# Patient Record
Sex: FEMALE | Race: ASIAN | NOT HISPANIC OR LATINO | URBAN - METROPOLITAN AREA
[De-identification: names, ages, dates, MRNs, and addresses within clinical notes are randomized per-mention and may not be internally consistent; named-entity substitution may affect disease eponyms.]

---

## 2021-02-14 ENCOUNTER — EMERGENCY (EMERGENCY)
Facility: HOSPITAL | Age: 31
LOS: 1 days | Discharge: HOME | End: 2021-02-14
Admitting: STUDENT IN AN ORGANIZED HEALTH CARE EDUCATION/TRAINING PROGRAM
Payer: MEDICAID

## 2021-02-14 VITALS
SYSTOLIC BLOOD PRESSURE: 114 MMHG | DIASTOLIC BLOOD PRESSURE: 74 MMHG | TEMPERATURE: 98 F | HEART RATE: 87 BPM | OXYGEN SATURATION: 100 % | RESPIRATION RATE: 18 BRPM

## 2021-02-14 VITALS
SYSTOLIC BLOOD PRESSURE: 107 MMHG | OXYGEN SATURATION: 100 % | DIASTOLIC BLOOD PRESSURE: 63 MMHG | RESPIRATION RATE: 16 BRPM | HEART RATE: 75 BPM | TEMPERATURE: 99 F

## 2021-02-14 DIAGNOSIS — Z90.49 ACQUIRED ABSENCE OF OTHER SPECIFIED PARTS OF DIGESTIVE TRACT: Chronic | ICD-10-CM

## 2021-02-14 LAB
ALBUMIN SERPL ELPH-MCNC: 4.1 G/DL — SIGNIFICANT CHANGE UP (ref 3.3–5)
ALP SERPL-CCNC: 99 U/L — SIGNIFICANT CHANGE UP (ref 40–120)
ALT FLD-CCNC: 27 U/L — SIGNIFICANT CHANGE UP (ref 4–33)
ANION GAP SERPL CALC-SCNC: 9 MMOL/L — SIGNIFICANT CHANGE UP (ref 7–14)
APPEARANCE UR: CLEAR — SIGNIFICANT CHANGE UP
AST SERPL-CCNC: 18 U/L — SIGNIFICANT CHANGE UP (ref 4–32)
BASOPHILS # BLD AUTO: 0.04 K/UL — SIGNIFICANT CHANGE UP (ref 0–0.2)
BASOPHILS NFR BLD AUTO: 0.3 % — SIGNIFICANT CHANGE UP (ref 0–2)
BILIRUB SERPL-MCNC: <0.2 MG/DL — SIGNIFICANT CHANGE UP (ref 0.2–1.2)
BILIRUB UR-MCNC: NEGATIVE — SIGNIFICANT CHANGE UP
BUN SERPL-MCNC: 8 MG/DL — SIGNIFICANT CHANGE UP (ref 7–23)
CALCIUM SERPL-MCNC: 10 MG/DL — SIGNIFICANT CHANGE UP (ref 8.4–10.5)
CHLORIDE SERPL-SCNC: 103 MMOL/L — SIGNIFICANT CHANGE UP (ref 98–107)
CO2 SERPL-SCNC: 27 MMOL/L — SIGNIFICANT CHANGE UP (ref 22–31)
COLOR SPEC: SIGNIFICANT CHANGE UP
CREAT SERPL-MCNC: 0.53 MG/DL — SIGNIFICANT CHANGE UP (ref 0.5–1.3)
DIFF PNL FLD: NEGATIVE — SIGNIFICANT CHANGE UP
EOSINOPHIL # BLD AUTO: 0.17 K/UL — SIGNIFICANT CHANGE UP (ref 0–0.5)
EOSINOPHIL NFR BLD AUTO: 1.4 % — SIGNIFICANT CHANGE UP (ref 0–6)
GLUCOSE SERPL-MCNC: 105 MG/DL — HIGH (ref 70–99)
GLUCOSE UR QL: NEGATIVE — SIGNIFICANT CHANGE UP
HCT VFR BLD CALC: 36.3 % — SIGNIFICANT CHANGE UP (ref 34.5–45)
HGB BLD-MCNC: 11.8 G/DL — SIGNIFICANT CHANGE UP (ref 11.5–15.5)
IANC: 8.12 K/UL — SIGNIFICANT CHANGE UP (ref 1.5–8.5)
IMM GRANULOCYTES NFR BLD AUTO: 0.3 % — SIGNIFICANT CHANGE UP (ref 0–1.5)
KETONES UR-MCNC: NEGATIVE — SIGNIFICANT CHANGE UP
LEUKOCYTE ESTERASE UR-ACNC: NEGATIVE — SIGNIFICANT CHANGE UP
LIDOCAIN IGE QN: 37 U/L — SIGNIFICANT CHANGE UP (ref 7–60)
LYMPHOCYTES # BLD AUTO: 2.88 K/UL — SIGNIFICANT CHANGE UP (ref 1–3.3)
LYMPHOCYTES # BLD AUTO: 23.6 % — SIGNIFICANT CHANGE UP (ref 13–44)
MCHC RBC-ENTMCNC: 27.8 PG — SIGNIFICANT CHANGE UP (ref 27–34)
MCHC RBC-ENTMCNC: 32.5 GM/DL — SIGNIFICANT CHANGE UP (ref 32–36)
MCV RBC AUTO: 85.6 FL — SIGNIFICANT CHANGE UP (ref 80–100)
MONOCYTES # BLD AUTO: 0.94 K/UL — HIGH (ref 0–0.9)
MONOCYTES NFR BLD AUTO: 7.7 % — SIGNIFICANT CHANGE UP (ref 2–14)
NEUTROPHILS # BLD AUTO: 8.12 K/UL — HIGH (ref 1.8–7.4)
NEUTROPHILS NFR BLD AUTO: 66.7 % — SIGNIFICANT CHANGE UP (ref 43–77)
NITRITE UR-MCNC: NEGATIVE — SIGNIFICANT CHANGE UP
NRBC # BLD: 0 /100 WBCS — SIGNIFICANT CHANGE UP
NRBC # FLD: 0 K/UL — SIGNIFICANT CHANGE UP
PH UR: 6 — SIGNIFICANT CHANGE UP (ref 5–8)
PLATELET # BLD AUTO: 349 K/UL — SIGNIFICANT CHANGE UP (ref 150–400)
POTASSIUM SERPL-MCNC: 4 MMOL/L — SIGNIFICANT CHANGE UP (ref 3.5–5.3)
POTASSIUM SERPL-SCNC: 4 MMOL/L — SIGNIFICANT CHANGE UP (ref 3.5–5.3)
PROT SERPL-MCNC: 7.7 G/DL — SIGNIFICANT CHANGE UP (ref 6–8.3)
PROT UR-MCNC: NEGATIVE — SIGNIFICANT CHANGE UP
RBC # BLD: 4.24 M/UL — SIGNIFICANT CHANGE UP (ref 3.8–5.2)
RBC # FLD: 13.3 % — SIGNIFICANT CHANGE UP (ref 10.3–14.5)
SODIUM SERPL-SCNC: 139 MMOL/L — SIGNIFICANT CHANGE UP (ref 135–145)
SP GR SPEC: 1.01 — SIGNIFICANT CHANGE UP (ref 1.01–1.02)
UROBILINOGEN FLD QL: SIGNIFICANT CHANGE UP
WBC # BLD: 12.19 K/UL — HIGH (ref 3.8–10.5)
WBC # FLD AUTO: 12.19 K/UL — HIGH (ref 3.8–10.5)

## 2021-02-14 PROCEDURE — 99284 EMERGENCY DEPT VISIT MOD MDM: CPT

## 2021-02-14 PROCEDURE — 76705 ECHO EXAM OF ABDOMEN: CPT | Mod: 26

## 2021-02-14 RX ORDER — FAMOTIDINE 10 MG/ML
20 INJECTION INTRAVENOUS ONCE
Refills: 0 | Status: COMPLETED | OUTPATIENT
Start: 2021-02-14 | End: 2021-02-14

## 2021-02-14 RX ORDER — SODIUM CHLORIDE 9 MG/ML
1000 INJECTION INTRAMUSCULAR; INTRAVENOUS; SUBCUTANEOUS ONCE
Refills: 0 | Status: COMPLETED | OUTPATIENT
Start: 2021-02-14 | End: 2021-02-14

## 2021-02-14 RX ADMIN — FAMOTIDINE 20 MILLIGRAM(S): 10 INJECTION INTRAVENOUS at 22:42

## 2021-02-14 RX ADMIN — SODIUM CHLORIDE 1000 MILLILITER(S): 9 INJECTION INTRAMUSCULAR; INTRAVENOUS; SUBCUTANEOUS at 22:42

## 2021-02-14 NOTE — ED PROVIDER NOTE - NSFOLLOWUPCLINICS_GEN_ALL_ED_FT
[facility]  Gastroenterology  [address] [place] [id number]  Phone: [phone]  Fax:   Follow Up Time: Routine

## 2021-02-14 NOTE — ED ADULT NURSE NOTE - NSIMPLEMENTINTERV_GEN_ALL_ED
Implemented All Universal Safety Interventions:  [place] to call system. Call bell, personal items and telephone within reach. Instruct patient to call for assistance. Room bathroom lighting operational. Non-slip footwear when patient is off stretcher. Physically safe environment: no spills, clutter or unnecessary equipment. Stretcher in lowest position, wheels locked, appropriate side rails in place.

## 2021-02-14 NOTE — ED PROVIDER NOTE - PATIENT PORTAL LINK FT
You can access the FollowMyHealth Patient Portal offered by [facility] by registering at the following website: [email or web address]. By joining [facility]’s FollowMyHealth portal, you will also be able to view your health information using other applications (apps) compatible with our system.

## 2021-02-14 NOTE — ED PROVIDER NOTE - OBJECTIVE STATEMENT
30yF w/pmhx hypothyroid, pshx appendectomy presenting with upper abdominal pain x today. Pt states she woke this morning at 5am with upper abdominal pain, pain has been intermittent, worse after eating. Pt reports pain to bilateral upper quadrants. Pt denies fever/chills, nausea, vomiting, diarrhea, pelvic pain, dysuria, urinary frequency, vaginal discharge, cp, sob, headache, dizziness, recent travel or illness or any other concerns.

## 2021-02-14 NOTE — ED PROVIDER NOTE - CLINICAL SUMMARY MEDICAL DECISION MAKING FREE TEXT BOX
30yF w/pmhx hypothyroid, pshx appendectomy presenting with upper abdominal pain x today, intermittent. On exam pt is well appearing, +epigastric and RUQ tenderness. Concern for gallstones (biliary colic), pancreatitis (less likely), gastritis. Plan: cbc/cmp/lipase, US RUQ, trial pepcid, ucg, ua/culture

## 2021-02-14 NOTE — ED PROVIDER NOTE - PROGRESS NOTE DETAILS
[place]: Pt feeling better. Abd pain has resolved. Abd soft non tender. Pt tolerating PO. Labs unremarkable. Imaging negative. Advised to follow up with PCP within the next 1-2 days. If any worsening symptoms return to Emergency Department immediately. Pt verbalizes agreement and understanding. VSS.

## 2021-02-14 NOTE — ED ADULT NURSE NOTE - OBJECTIVE STATEMENT
Received PT to room 7, AAO4, ambulatory, c/o generalized abdominal pain since this AM. Denies chest pain, SOB, n/v/d, dizziness, dysuria. Able to make needs known. Right AC 20g placed, labs drawn, medicated, will continue to monitor.

## 2021-02-15 LAB
CULTURE RESULTS: SIGNIFICANT CHANGE UP
SPECIMEN SOURCE: SIGNIFICANT CHANGE UP

## 2021-02-15 RX ORDER — PANTOPRAZOLE SODIUM 20 MG/1
1 TABLET, DELAYED RELEASE ORAL
Qty: 10 | Refills: 0
Start: 2021-02-15 | End: 2021-02-24

## 2021-02-15 RX ADMIN — Medication 30 MILLILITER(S): at 01:16

## 2021-07-13 PROBLEM — E03.9 HYPOTHYROIDISM, UNSPECIFIED: Chronic | Status: ACTIVE | Noted: 2021-02-14

## 2021-07-22 PROBLEM — Z00.00 ENCOUNTER FOR PREVENTIVE HEALTH EXAMINATION: Status: ACTIVE | Noted: 2021-07-22

## 2021-07-28 ENCOUNTER — LABORATORY RESULT (OUTPATIENT)
Age: 31
End: 2021-07-28

## 2021-07-28 ENCOUNTER — APPOINTMENT (OUTPATIENT)
Dept: INTERNAL MEDICINE | Facility: CLINIC | Age: 31
End: 2021-07-28
Payer: MEDICAID

## 2021-07-28 VITALS
OXYGEN SATURATION: 96 % | TEMPERATURE: 97.7 F | WEIGHT: 220.99 LBS | HEART RATE: 122 BPM | HEIGHT: 64.53 IN | BODY MASS INDEX: 37.27 KG/M2 | SYSTOLIC BLOOD PRESSURE: 115 MMHG | DIASTOLIC BLOOD PRESSURE: 81 MMHG

## 2021-07-28 DIAGNOSIS — E53.8 DEFICIENCY OF OTHER SPECIFIED B GROUP VITAMINS: ICD-10-CM

## 2021-07-28 DIAGNOSIS — R20.0 ANESTHESIA OF SKIN: ICD-10-CM

## 2021-07-28 DIAGNOSIS — M25.50 PAIN IN UNSPECIFIED JOINT: ICD-10-CM

## 2021-07-28 DIAGNOSIS — R79.89 OTHER SPECIFIED ABNORMAL FINDINGS OF BLOOD CHEMISTRY: ICD-10-CM

## 2021-07-28 PROCEDURE — 99203 OFFICE O/P NEW LOW 30 MIN: CPT | Mod: 25

## 2021-08-03 LAB
25(OH)D3 SERPL-MCNC: 26.2 NG/ML
ALBUMIN SERPL ELPH-MCNC: 4.7 G/DL
ALP BLD-CCNC: 88 U/L
ALT SERPL-CCNC: 28 U/L
ANION GAP SERPL CALC-SCNC: 15 MMOL/L
APPEARANCE: ABNORMAL
APPEARANCE: ABNORMAL
AST SERPL-CCNC: 19 U/L
BACTERIA: NEGATIVE
BASOPHILS # BLD AUTO: 0.07 K/UL
BASOPHILS NFR BLD AUTO: 0.4 %
BILIRUB SERPL-MCNC: 0.2 MG/DL
BILIRUBIN URINE: NEGATIVE
BILIRUBIN URINE: NEGATIVE
BLOOD URINE: NEGATIVE
BLOOD URINE: NEGATIVE
BUN SERPL-MCNC: 11 MG/DL
CALCIUM SERPL-MCNC: 9.9 MG/DL
CELIACPAN: NORMAL
CHLORIDE SERPL-SCNC: 99 MMOL/L
CHOLEST SERPL-MCNC: 218 MG/DL
CO2 SERPL-SCNC: 25 MMOL/L
COLOR: YELLOW
COLOR: YELLOW
CREAT SERPL-MCNC: 0.54 MG/DL
EOSINOPHIL # BLD AUTO: 0.11 K/UL
EOSINOPHIL NFR BLD AUTO: 0.6 %
ESTIMATED AVERAGE GLUCOSE: 111 MG/DL
GLUCOSE QUALITATIVE U: NEGATIVE
GLUCOSE QUALITATIVE U: NEGATIVE
GLUCOSE SERPL-MCNC: 83 MG/DL
HBA1C MFR BLD HPLC: 5.5 %
HCT VFR BLD CALC: 41.5 %
HDLC SERPL-MCNC: 60 MG/DL
HGB BLD-MCNC: 12.7 G/DL
HYALINE CASTS: 3 /LPF
IF BLOCK AB SER QL: 1 AU/ML
IMM GRANULOCYTES NFR BLD AUTO: 0.4 %
KETONES URINE: NEGATIVE
KETONES URINE: NEGATIVE
LDLC SERPL CALC-MCNC: 115 MG/DL
LEUKOCYTE ESTERASE URINE: NEGATIVE
LEUKOCYTE ESTERASE URINE: NEGATIVE
LYMPHOCYTES # BLD AUTO: 3.95 K/UL
LYMPHOCYTES NFR BLD AUTO: 20.4 %
MAN DIFF?: NORMAL
MCHC RBC-ENTMCNC: 28.5 PG
MCHC RBC-ENTMCNC: 30.6 GM/DL
MCV RBC AUTO: 93 FL
MICROSCOPIC-UA: NORMAL
MONOCYTES # BLD AUTO: 1.49 K/UL
MONOCYTES NFR BLD AUTO: 7.7 %
NEUTROPHILS # BLD AUTO: 13.64 K/UL
NEUTROPHILS NFR BLD AUTO: 70.5 %
NITRITE URINE: NEGATIVE
NITRITE URINE: NEGATIVE
NONHDLC SERPL-MCNC: 158 MG/DL
PCA AB SER QL IF: NORMAL
PH URINE: 6
PH URINE: 6
PLATELET # BLD AUTO: 400 K/UL
POTASSIUM SERPL-SCNC: 3.8 MMOL/L
PROT SERPL-MCNC: 7.6 G/DL
PROTEIN URINE: NORMAL
PROTEIN URINE: NORMAL
RBC # BLD: 4.46 M/UL
RBC # FLD: 13.3 %
RED BLOOD CELLS URINE: 1 /HPF
SODIUM SERPL-SCNC: 138 MMOL/L
SPECIFIC GRAVITY URINE: 1.03
SPECIFIC GRAVITY URINE: 1.03
SQUAMOUS EPITHELIAL CELLS: 1 /HPF
T3FREE SERPL-MCNC: 3.01 PG/ML
T4 FREE SERPL-MCNC: 1.3 NG/DL
TRIGL SERPL-MCNC: 211 MG/DL
TSH SERPL-ACNC: 6.04 UIU/ML
UROBILINOGEN URINE: NORMAL
UROBILINOGEN URINE: NORMAL
WBC # FLD AUTO: 19.33 K/UL
WHITE BLOOD CELLS URINE: 0 /HPF

## 2021-08-03 NOTE — PHYSICAL EXAM
[Normal] : no rash [de-identified] : Tenderness over the greater trochanter, LUAN test elicits pain over the greater trochanter right lower back [de-identified] : Tinel sign positive in the left hand

## 2021-08-03 NOTE — ASSESSMENT
[FreeTextEntry1] : To continue follow-up with rheumatology for joint pain\par Patient does not have any tender points, suspect that carpal tunnel may be contributing to symptoms advised wrist splint if no improvement in 4 to 6 weeks to see neurologist\par Back pain may be secondary to trochanteric bursitis advised to see orthopedic surgery\par Low vitamin C advised supplementation daily\par Low vitamin B12 continue with supplementation and to check intrinsic factor and antiparietal cell antibody factors

## 2021-08-03 NOTE — HISTORY OF PRESENT ILLNESS
[FreeTextEntry8] : Patient presents to the office for pain throughout body\par Has seen rheumatologist had blood work done was treated with medication for rheumatoid arthritis has not seen improvement.\par Was found to have elevated inflammatory markers subsequently improved,\par Does have pain in the left hand, pain in the left arm is with numbness in the fingertips worse at night or with activity.\par Has right lower back pain, the back pain is when lying on the affected side, denies any numbness has tried physical therapy anti-inflammatories and has had MRI with no pathology.\par Blood work showed low vitamin B12 and low vitamin C levels

## 2021-08-30 ENCOUNTER — APPOINTMENT (OUTPATIENT)
Dept: INTERNAL MEDICINE | Facility: CLINIC | Age: 31
End: 2021-08-30

## 2022-12-20 ENCOUNTER — EMERGENCY (EMERGENCY)
Facility: HOSPITAL | Age: 32
LOS: 1 days | Discharge: HOME | End: 2022-12-20
Attending: STUDENT IN AN ORGANIZED HEALTH CARE EDUCATION/TRAINING PROGRAM | Admitting: STUDENT IN AN ORGANIZED HEALTH CARE EDUCATION/TRAINING PROGRAM

## 2022-12-20 VITALS
OXYGEN SATURATION: 100 % | TEMPERATURE: 98 F | SYSTOLIC BLOOD PRESSURE: 109 MMHG | RESPIRATION RATE: 18 BRPM | DIASTOLIC BLOOD PRESSURE: 55 MMHG | HEART RATE: 79 BPM

## 2022-12-20 DIAGNOSIS — Z90.49 ACQUIRED ABSENCE OF OTHER SPECIFIED PARTS OF DIGESTIVE TRACT: Chronic | ICD-10-CM

## 2022-12-20 LAB
FLUAV AG NPH QL: SIGNIFICANT CHANGE UP
FLUBV AG NPH QL: SIGNIFICANT CHANGE UP
RSV RNA NPH QL NAA+NON-PROBE: SIGNIFICANT CHANGE UP
SARS-COV-2 RNA SPEC QL NAA+PROBE: SIGNIFICANT CHANGE UP

## 2022-12-20 PROCEDURE — 99220: CPT

## 2022-12-20 PROCEDURE — G1004: CPT

## 2022-12-20 PROCEDURE — 72148 MRI LUMBAR SPINE W/O DYE: CPT | Mod: 26,MF

## 2022-12-20 RX ORDER — ACETAMINOPHEN 500 MG
650 TABLET ORAL EVERY 6 HOURS
Refills: 0 | Status: DISCONTINUED | OUTPATIENT
Start: 2022-12-20 | End: 2022-12-23

## 2022-12-20 RX ORDER — KETOROLAC TROMETHAMINE 30 MG/ML
15 SYRINGE (ML) INJECTION EVERY 6 HOURS
Refills: 0 | Status: DISCONTINUED | OUTPATIENT
Start: 2022-12-20 | End: 2022-12-21

## 2022-12-20 RX ORDER — DIAZEPAM 5 MG
5 TABLET ORAL EVERY 8 HOURS
Refills: 0 | Status: DISCONTINUED | OUTPATIENT
Start: 2022-12-20 | End: 2022-12-21

## 2022-12-20 RX ORDER — OXYCODONE HYDROCHLORIDE 5 MG/1
5 TABLET ORAL ONCE
Refills: 0 | Status: DISCONTINUED | OUTPATIENT
Start: 2022-12-20 | End: 2022-12-20

## 2022-12-20 RX ORDER — ACETAMINOPHEN 500 MG
975 TABLET ORAL ONCE
Refills: 0 | Status: COMPLETED | OUTPATIENT
Start: 2022-12-20 | End: 2022-12-20

## 2022-12-20 RX ORDER — KETOROLAC TROMETHAMINE 30 MG/ML
15 SYRINGE (ML) INJECTION ONCE
Refills: 0 | Status: DISCONTINUED | OUTPATIENT
Start: 2022-12-20 | End: 2022-12-20

## 2022-12-20 RX ORDER — LIDOCAINE 4 G/100G
1 CREAM TOPICAL ONCE
Refills: 0 | Status: COMPLETED | OUTPATIENT
Start: 2022-12-20 | End: 2022-12-20

## 2022-12-20 RX ORDER — DIAZEPAM 5 MG
5 TABLET ORAL ONCE
Refills: 0 | Status: DISCONTINUED | OUTPATIENT
Start: 2022-12-20 | End: 2022-12-20

## 2022-12-20 RX ORDER — OXYCODONE HYDROCHLORIDE 5 MG/1
5 TABLET ORAL EVERY 8 HOURS
Refills: 0 | Status: DISCONTINUED | OUTPATIENT
Start: 2022-12-20 | End: 2022-12-20

## 2022-12-20 RX ADMIN — Medication 975 MILLIGRAM(S): at 07:54

## 2022-12-20 RX ADMIN — Medication 15 MILLIGRAM(S): at 23:18

## 2022-12-20 RX ADMIN — LIDOCAINE 1 PATCH: 4 CREAM TOPICAL at 07:14

## 2022-12-20 RX ADMIN — Medication 15 MILLIGRAM(S): at 15:35

## 2022-12-20 RX ADMIN — Medication 15 MILLIGRAM(S): at 07:13

## 2022-12-20 RX ADMIN — Medication 5 MILLIGRAM(S): at 07:12

## 2022-12-20 RX ADMIN — Medication 5 MILLIGRAM(S): at 15:34

## 2022-12-20 RX ADMIN — OXYCODONE HYDROCHLORIDE 5 MILLIGRAM(S): 5 TABLET ORAL at 10:09

## 2022-12-20 RX ADMIN — Medication 975 MILLIGRAM(S): at 08:34

## 2022-12-20 RX ADMIN — Medication 15 MILLIGRAM(S): at 23:33

## 2022-12-20 NOTE — ED CDU PROVIDER INITIAL DAY NOTE - EXTREMITY EXAM
limited ROM of lower extremities due to back pain, sensation intact b/l, strength 5/5 b/l, no foot drop b/l.

## 2022-12-20 NOTE — ED PROVIDER NOTE - NSFOLLOWUPINSTRUCTIONS_ED_ALL_ED_FT
Follow up with the [facility]. You will be called in 24-48 hours to make this appointment. Call the Emergency Department if you have difficulties getting your appointment. The phone number for the Emergency Department is found on the upper left hand side of this paperwork.     Immediately return to the Emergency Department for any new or markedly worsening symptoms.     the prescription sent to your pharmacy within the next 12 hours. Take all new and previous medications as prescribed.     Alternate between Tylenol and Ibuprofen. Take 1 g of Tylenol, 4 hours later take 600 mg of Ibuprofen, 4 hours after this take 1 g of Tylenol. Continuing alternating like this as needed for pain. If you do not have pain, you do not need to take this medication.      Use over counter ibuprofen and tylenol for management of your pain. Use only as needed. Read the instructions on the medication container carefully. Follow these instructions when using these medications. These medications can be dangerous when used incorrectly. Possible consequences include liver and kidney disease. These medications are very safe when used correctly.

## 2022-12-20 NOTE — ED PROVIDER NOTE - ATTENDING CONTRIBUTION TO CARE
31yo F ho hypothyroidism, rheumatoid arthritis, pw acute on chronic back pain. pt has been suffereing from lower back pain bl for several months, has been seen in EDs and pcp for back pain 33yo F ho hypothyroidism, rheumatoid arthritis, pw acute on chronic back pain. pt has been suffereing from lower back pain bl for several months, has been seen in EDs and pcp for back pain and given mobic  and methocarbamol, over last few days pain has been more severe, no lower extremity weakness, no numbness, has been able to ambulate, but with pain, no fecal or urinary incontinence   on exam no midline tendenress, + straight leg raise, motor and sensory intact, will give pain contorl, reassess

## 2022-12-20 NOTE — ED ADULT TRIAGE NOTE - CHIEF COMPLAINT QUOTE
Pt c/o lower back pain x few months. Pain worsened today. No heavy lifting or trauma to area. Denies numbness/tingling, urinary complaints. Denies PMHx

## 2022-12-20 NOTE — ED CDU PROVIDER INITIAL DAY NOTE - ATTENDING APP SHARED VISIT CONTRIBUTION OF CARE
31 yo F with PMH of RA, hypothyroid presents to ED c/o severe lower back pain since yesterday. In ED, pt given Tylenol 975mg , Toradol 15mg IV, Valium 5mg, Lidocaine patch, Oxycodone 5mg w/ mild relief. Plan: MR LS spine to r/o acute spinal pathology, pain control, and reassessment.

## 2022-12-20 NOTE — ED CDU PROVIDER INITIAL DAY NOTE - OBJECTIVE STATEMENT
Conjuntivae and eyelids appear normal 33 yo F with PMH of RA, hypothyroid presents to ED c/o severe lower back pain since yesterday. Reports sharp pain, in both side, 7/10, non-radiating, worse with sitting up, walking. States pain not improved with Tylenol and Ibuprofen. Admits she has same pain a month ago, was seen in the ED, given muscle relaxer. Denies hx of malignancy, unexplained weight loss, fever, rigors, malaise, recent infection, hx of IVDU, saddle anesthesia/perianal sensory loss, decreased rectal tone, urinary retention, inability to control urine from overflow, weakness, numbness, recent travel, or any other complaints.

## 2022-12-20 NOTE — ED PROVIDER NOTE - PHYSICAL EXAMINATION
Gen: NAD, non-toxic appearing  Head: normal appearing  HEENT: normal conjunctiva  Abd: soft, non distended, non tender   MSK: no visible deformities, paraspinal tenderness just superior to the sacrum, no midline tenderness  Neuro: alert and grossly oriented, no gross motor deficits, 5/5 strength lower extremities, intact sensation to light touch throughout the lower extremities  Skin: No natasha rashes

## 2022-12-20 NOTE — ED PROVIDER NOTE - PROGRESS NOTE DETAILS
Pretty Lund, PGY1, MD: Patient signed out to me pending pain medication and reassessment for symptom relief.  Patient seen and examined at bedside patient was standing with  for support, patient cannot bear weight on right leg due to tightness and pain in her right lower back.  Patient states that when she lays still she has no pain but her pain is exacerbated by standing and moving.  Patient appears uncomfortable, has tenderness to palpation in the right lower back, I personally ambulated the patient patient appears uncomfortable walking and cannot bear weight completely on her right due to pain exacerbation.  Patient has positive straight leg raise bilaterally.  Patient states that yesterday she had pain relief while walking, however today walking exacerbates pain.  there is no evidence of red flag symptoms at this time. we will attempt to treat pain with oxycodone 5 mg and if this does not improve the pain will consider CDU for admission for observation and pain control Pretty Lund, PGY1, MD:  Patient reassessed after being given 5 mg oxycodone, patient reports her pain is more improved. on exam: Patient able to bear more weight on her right however still is limping and appears in pain with ambulation.  Patient given several hot packs to try to relieve tightness in the lower back will reassess.  If patient pain is not improved and if she is unable to ambulate will need to CDU admission Pretty Lund, PGY1, MD: pt reassessed, reports improved pain however c/o dizziness from pain medications. Pt able to ambulate and bear weight on right better than before, still needs some assistance with ambulation. Pretty Lund, PGY1, MD: pt reassessed, reports improved pain however c/o dizziness from pain medications. Pt able to ambulate and bear weight on right better than before, still needs assistance with ambulation. Pt is afraid of pain returning and cannot walk at all when pain returns. Pt willing to stay in the CDU for MRI to eval for possible disc herniations or nerve issues.

## 2022-12-20 NOTE — ED PROVIDER NOTE - CLINICAL SUMMARY MEDICAL DECISION MAKING FREE TEXT BOX
32-year-old female, with a history of  chronic back pain, presenting for acute on chronic exacerbation of known back pain starting 2 days ago.  Atraumatic onset.  Vital signs are unremarkable.  No red flags of back pain on history or physical exam.  Will treat as musculoskeletal versus spasm exacerbation in a patient with a history of very similar pains in the past.  No concern for cauda equina or acute cord syndrome at this time.  No signs or symptoms to suggest urinary tract infection or rupture of peritoneal hematoma as cause of back pain.  Will treat symptomatically with Toradol after ruling out pregnancy, Valium, lidocaine patch.  Reassess.  Likely discharge with spine center referral.

## 2022-12-20 NOTE — ED CDU PROVIDER INITIAL DAY NOTE - CLINICAL SUMMARY MEDICAL DECISION MAKING FREE TEXT BOX
33 yo F with PMH of RA, hypothyroid presents to ED c/o severe lower back pain since yesterday. In ED, pt given Tylenol 975mg , Toradol 15mg IV, Valium 5mg, Lidocaine patch, Oxycodone 5mg w/ mild relief. Plan: MR LS spine to r/o acute spinal pathology, pain control, and reassessment.

## 2022-12-20 NOTE — ED PROVIDER NOTE - OBJECTIVE STATEMENT
This is a 33 yo F w/ a PMHX of RA and hypothyrodism who presents w/ back pain.   Location: lower lumbar, paraspinal, bilateral  Severity: severe  Duration: 2 days, acute on chronic, ongoing for several years  Timing: gradual onset  Setting: atraumatic onset   Aggravating factors: none  Alleviating factors: none  Associated symptoms: no associated weakness, numbness, b/b incontinence, dysuria,    No hx of similar sxs. No known hx of cancer, AAA, imm deficiency, OP, or recent blood stream infection. No hx of IV drug use. No hx of back surgery. Not on steroids or anti-coagulants.   Home Rx = levothyroxine, sulfasalazine, pepcid. Known allergies = nkda.

## 2022-12-21 VITALS
OXYGEN SATURATION: 98 % | SYSTOLIC BLOOD PRESSURE: 110 MMHG | HEART RATE: 72 BPM | DIASTOLIC BLOOD PRESSURE: 61 MMHG | TEMPERATURE: 98 F | RESPIRATION RATE: 20 BRPM

## 2022-12-21 PROBLEM — M06.9 RHEUMATOID ARTHRITIS, UNSPECIFIED: Chronic | Status: ACTIVE | Noted: 2022-12-20

## 2022-12-21 PROCEDURE — 99217: CPT

## 2022-12-21 RX ADMIN — Medication 650 MILLIGRAM(S): at 11:06

## 2022-12-21 RX ADMIN — Medication 15 MILLIGRAM(S): at 06:26

## 2022-12-21 RX ADMIN — Medication 15 MILLIGRAM(S): at 06:11

## 2022-12-21 RX ADMIN — Medication 5 MILLIGRAM(S): at 10:36

## 2022-12-21 RX ADMIN — Medication 650 MILLIGRAM(S): at 10:36

## 2022-12-21 NOTE — ED CDU PROVIDER SUBSEQUENT DAY NOTE - ATTENDING APP SHARED VISIT CONTRIBUTION OF CARE
I (Deng) agree with above, I performed a history and physical. Counseled jacky medical staff, physician assistant, and/or medical student on medical decision making as documented. Medical decisions and treatment interventions were made in real time during the patient encounter. Additionally and/or with the following exceptions: pain improved, follow up with rehab as outpatient, ambulatory no difficulty. Return precautions reviewed. Patient/family verbalized understand of conditions for return and plan for follow up. Patient/family was instructed to utilize [phone] to obtain follow up as indicated.

## 2022-12-21 NOTE — ED CDU PROVIDER DISPOSITION NOTE - CLINICAL COURSE
presented with back pain, pain improved, imaging with mild degenerative changes, ambulatory, Return precautions reviewed. Patient/family verbalized understand of conditions for return and plan for follow up. Patient/family was instructed to utilize [phone] to obtain follow up as indicated.

## 2022-12-21 NOTE — ED CDU PROVIDER SUBSEQUENT DAY NOTE - NS ED ATTENDING STATEMENT MOD
This was a shared visit with the SAHY. I reviewed and verified the documentation and independently performed the documented:

## 2022-12-21 NOTE — ED CDU PROVIDER DISPOSITION NOTE - NSFOLLOWUPINSTRUCTIONS_ED_ALL_ED_FT
take acetaminophen 650 mg every 6 hours and ibuprofen 400 mg every 6 hours with food. Both of these medications work differently to treat pain and inflammation. Please note that these medications are both available over the counter at most pharmacies without a doctor's prescription.     Back Pain    WHAT YOU NEED TO KNOW:    Back pain is common. You may have back pain and muscle spasms. You may feel sore or stiff on one or both sides of your back. The pain may spread to your lower body. Conditions that affect the spine, joints, or muscles can cause back pain. These may include arthritis, spinal stenosis (narrowing of the spinal column), muscle tension, or breakdown of the spinal discs.    DISCHARGE INSTRUCTIONS:    Call your local emergency number (911 in the [place]) if:    You have severe back pain with chest pain.    You cannot control your urine or bowel movements.    Your pain becomes so severe that you cannot walk.  Return to the emergency department if:    You have pain, numbness, or weakness in one or both legs.    You have severe back pain, nausea, and vomiting.    You have severe back pain that spreads to your side or genital area.  Call your doctor if:    You have back pain that does not get better with rest and pain medicine.    You have a fever.    You have pain that worsens when you are on your back or when you rest.    You have pain that worsens when you cough or sneeze.    You lose weight without trying.    You have questions or concerns about your condition or care.  Medicines: You may need any of the following:    NSAIDs, such as ibuprofen, help decrease swelling, pain, and fever. This medicine is available with or without a doctor's order. NSAIDs can cause stomach bleeding or kidney problems in certain people. If you take blood thinner medicine, always ask your healthcare provider if NSAIDs are safe for you. Always read the medicine label and follow directions.    Acetaminophen decreases pain and fever. It is available without a doctor's order. Ask how much to take and how often to take it. Follow directions. Read the labels of all other medicines you are using to see if they also contain acetaminophen, or ask your doctor or pharmacist. Acetaminophen can cause liver damage if not taken correctly. Do not use more than 4 grams (4,000 milligrams) total of acetaminophen in one day.    Muscle relaxers help decrease muscle spasms and back pain.    Prescription pain medicine may be given. Ask your healthcare provider how to take this medicine safely. Some prescription pain medicines contain acetaminophen. Do not take other medicines that contain acetaminophen without talking to your healthcare provider. Too much acetaminophen may cause liver damage. Prescription pain medicine may cause constipation. Ask your healthcare provider how to prevent or treat constipation.    Take your medicine as directed. Contact your healthcare provider if you think your medicine is not helping or if you have side effects. Tell him or her if you are allergic to any medicine. Keep a list of the medicines, vitamins, and herbs you take. Include the amounts, and when and why you take them. Bring the list or the pill bottles to follow-up visits. Carry your medicine list with you in case of an emergency.  How to manage your back pain:    Apply ice on your back for 15 to 20 minutes every hour or as directed. Use an ice pack, or put crushed ice in a plastic bag. Cover it with a towel before you apply it to your skin. Ice helps prevent tissue damage and decreases pain.    Apply heat on your back for 20 to 30 minutes every 2 hours for as many days as directed. Heat helps decrease pain and muscle spasms.    Stay active as much as you can without causing more pain. Bed rest could make your back pain worse. Avoid heavy lifting until your pain is gone.    Go to physical therapy as directed. A physical therapist can teach you exercises to help improve movement and strength, and to decrease pain.  Follow up with your doctor in 2 weeks, or as directed: You might need to see a specialist. Write down your questions so you remember to ask them during your visits.

## 2022-12-21 NOTE — ED CDU PROVIDER SUBSEQUENT DAY NOTE - HISTORY
33 yo F with PMH of RA, hypothyroid presents to ED c/o severe lower back pain since yesterday.  _____________    In interim patient has been resting comfortably and slept through the night. pt states pain is worse when she sits; lying flat helps to alleviate the pain. Awaiting MR results.

## 2022-12-21 NOTE — ED CDU PROVIDER DISPOSITION NOTE - PATIENT PORTAL LINK FT
You can access the FollowMyHealth Patient Portal offered by [facility] by registering at the following website: [email or web address]. By joining [facility]’s FollowMyHealth portal, you will also be able to view your health information using other applications (apps) compatible with our system.

## 2022-12-21 NOTE — ED CDU PROVIDER SUBSEQUENT DAY NOTE - PHYSICAL EXAMINATION
CONSTITUTIONAL: Well-appearing; well-nourished; in no apparent distress. Non-toxic appearing.   NEURO: Alert & oriented. Gait steady without assistance. Sensory and motor functions are grossly intact.  PSYCH: Mood appropriate. Thought processes intact.   NECK: Supple  CARD: Regular rate and rhythm, no murmurs  RESP: No accessory muscle use; breath sounds clear and equal bilaterally; no wheezes, rhonchi, or rales     ABD: Soft; non-distended; non-tender.   MUSCULOSKELETAL/EXTREMITIES: FROM in all four extremities; no extremity edema.  Back: No obvious deformity, ecchymosis, contusions, or rash. There is no bony tenderness to palpation. (+) R-sided paraspinous muscle tenderness. Negative straight leg raise. ROM intact but painful with flexion/extension.   SKIN: Warm; dry; no apparent lesions or exudate

## 2022-12-22 ENCOUNTER — APPOINTMENT (OUTPATIENT)
Dept: ORTHOPEDIC SURGERY | Facility: CLINIC | Age: 32
End: 2022-12-22

## 2022-12-22 VITALS — WEIGHT: 204 LBS | BODY MASS INDEX: 33.99 KG/M2 | HEIGHT: 65 IN

## 2022-12-22 PROCEDURE — 99204 OFFICE O/P NEW MOD 45 MIN: CPT

## 2022-12-22 RX ORDER — OMEPRAZOLE 40 MG/1
40 CAPSULE, DELAYED RELEASE ORAL
Qty: 30 | Refills: 1 | Status: ACTIVE | COMMUNITY
Start: 2022-12-22 | End: 1900-01-01

## 2022-12-22 RX ORDER — CELECOXIB 200 MG/1
200 CAPSULE ORAL
Qty: 60 | Refills: 0 | Status: ACTIVE | COMMUNITY
Start: 2022-12-22 | End: 1900-01-01

## 2022-12-22 NOTE — HISTORY OF PRESENT ILLNESS
[de-identified] : This 32-year-old woman is seen in the office today along with her .  She has a 1 to 2-year degree of intermittent lower back pain.  The symptoms increased significantly 2 months ago without history of injury.  Currently the lower back pain is bilateral without associated buttock or leg pain.  She has not had associated neurologic symptoms of numbness, paresthesias or weakness.  The pain is worse with coughing, sneezing and forcing to move her bowels.  She has had night pain.  The pain is worse with sitting and slightly worse standing.  She is comfortable walking.  She was on Robaxin for a while and her primary care recent we started her on Toradol but she has had to recent tests positive for H. pylori 1 before treatment and 1 after treatment and further treatment is indicated.  She has used heat.  For a few days she took ibuprofen 1200 mg twice a day.  Her past medical history is positive only for an appendectomy as a child.  She reportedly has rheumatoid arthritis but the only joint pain she has had is in her back.  There is no lab work in the chart that would confirm this diagnosis.  She is also on thyroid medicine. [Pain Location] : pain [Worsening] : worsening [9] : a maximum pain level of 9/10 [Sitting] : sitting [Standing] : standing

## 2022-12-22 NOTE — DISCUSSION/SUMMARY
[Medication Risks Reviewed] : Medication risks reviewed [de-identified] : Prior to H. pylori she has been on famotidine for reflux symptoms which had controlled it.  She has been started on omeprazole 40 mg once a day along with her Pepcid I initially recommended a course of meloxicam as it would be less likely to aggravate her upper GI tract but she says she has tried it in the past without relief.  She has been started on Celebrex 20 mg twice a day.  I will see her for follow-up in 4 weeks.  She will call if there are problems with the medication or worsening of her symptoms.

## 2022-12-22 NOTE — PHYSICAL EXAM
[de-identified] : She is fully alert and oriented with a normal mood and affect.  She is in no acute distress as a take the history.  She is overweight.  She ambulates with a normal gait including tiptoe and heel walking.  There are no cutaneous abnormalities or palpable bony defects of the spine.  There is no evidence of shortness of breath or respiratory distress.  There is no paravertebral muscle spasm or trochanteric tenderness.  There is mild bilateral sciatic notch sensitivity.  Forward flexion of the spine to 40 or 50 degrees causes lower back discomfort.  There is no pain with extension of the spine.  A lower extremity neurological examination revealed trace to 1+ symmetrical reflexes.  Motor power is normal to manual testing in all lower extremity groups and sensation is normal to light touch in all dermatomes.  Straight leg raising is negative to 90 degrees in the sitting position bilaterally.  Her hips and her knees have a full and painless range of motion with normal stability.  Vascular examination shows no evidence of significant varicosities and there is no lymphedema.  There are no cutaneous abnormalities of the upper or the lower extremities. [de-identified] : She has had a recent MRI of the lumbar spine that I have reviewed.  There is disc desiccation with loss of water content at L4-5 and L5-S1 with some minor bulges at those levels and perhaps a little high intensity spot in the posterior portion of the L5-S1 disc.  There is no evidence of any cauda equina or nerve root compression.

## 2023-01-19 ENCOUNTER — APPOINTMENT (OUTPATIENT)
Dept: ORTHOPEDIC SURGERY | Facility: CLINIC | Age: 33
End: 2023-01-19
Payer: MEDICAID

## 2023-01-19 DIAGNOSIS — M54.9 DORSALGIA, UNSPECIFIED: ICD-10-CM

## 2023-01-19 DIAGNOSIS — M54.41 LUMBAGO WITH SCIATICA, LEFT SIDE: ICD-10-CM

## 2023-01-19 DIAGNOSIS — G89.29 LUMBAGO WITH SCIATICA, LEFT SIDE: ICD-10-CM

## 2023-01-19 DIAGNOSIS — M51.36 OTHER INTERVERTEBRAL DISC DEGENERATION, LUMBAR REGION: ICD-10-CM

## 2023-01-19 DIAGNOSIS — M54.42 LUMBAGO WITH SCIATICA, LEFT SIDE: ICD-10-CM

## 2023-01-19 PROCEDURE — 99213 OFFICE O/P EST LOW 20 MIN: CPT

## 2023-01-19 RX ORDER — CELECOXIB 200 MG/1
200 CAPSULE ORAL
Qty: 60 | Refills: 0 | Status: ACTIVE | COMMUNITY
Start: 2023-01-19 | End: 1900-01-01

## 2023-01-19 NOTE — HISTORY OF PRESENT ILLNESS
[de-identified] : She was seen a month ago with 2 years of back pain particularly worse in the last 2 or 3 months.  The pain was worse with coughing, sneezing and forcing to move her bowels and she had night pain.  The pain was constant and has been graded as a 7 or an 8.  She has been on a proton pump inhibitor along with Celebrex 200 mg twice a day.  She also has a history of rheumatoid arthritis and an MRI that revealed significant disc desiccation at L4-5 and L5-S1 with perhaps a small annular tear.\par \par The symptoms are now intermittent and no worse than the 4-5 and mostly mild pain.  The symptoms are no longer worse coughing, sneezing and forcing to move her bowels and she no longer has night pain. [Pain Location] : pain [Improving] : improving [0] : a minimum pain level of 0/10 [5] : a maximum pain level of 5/10 [Intermit.] : ~He/She~ states the symptoms seem to be intermittent

## 2023-01-19 NOTE — DISCUSSION/SUMMARY
[Medication Risks Reviewed] : Medication risks reviewed [de-identified] : She has made more improvement than I thought she would.  She will renew the proton pump inhibitor and I have renewed the Celebrex 200 mg twice a day.  She will call if there are problems with the medication or worsening of her symptoms and I will see her for follow-up in 4 weeks.

## 2023-01-19 NOTE — REASON FOR VISIT
[Follow-Up Visit] : a follow-up visit for [Degenerative Joint Disease] : degenerative joint disease [Back Pain] : back pain [Spouse] : spouse

## 2023-02-16 ENCOUNTER — APPOINTMENT (OUTPATIENT)
Dept: ORTHOPEDIC SURGERY | Facility: CLINIC | Age: 33
End: 2023-02-16

## 2023-04-10 NOTE — ED PROVIDER NOTE - NS_EDPROVIDERDISPOUSERTYPE_ED_A_ED
I have personally evaluated and examined the patient. The Attending was available to me as a supervising provider if needed. Double O-Z Plasty Text: The defect edges were debeveled with a #15 scalpel blade. Given the location of the defect, shape of the defect and the proximity to free margins a Double O-Z plasty (double transposition flap) was deemed most appropriate. Using a sterile surgical marker, the appropriate transposition flaps were drawn incorporating the defect and placing the expected incisions within the relaxed skin tension lines where possible. The area thus outlined was incised deep to adipose tissue with a #15 scalpel blade. The skin margins were undermined to an appropriate distance in all directions utilizing iris scissors. Hemostasis was achieved with electrocautery. The flaps were then transposed and carried over into place, one clockwise and the other counterclockwise, and anchored with interrupted buried subcutaneous sutures.